# Patient Record
Sex: FEMALE | Race: WHITE | NOT HISPANIC OR LATINO | Employment: OTHER
[De-identification: names, ages, dates, MRNs, and addresses within clinical notes are randomized per-mention and may not be internally consistent; named-entity substitution may affect disease eponyms.]

---

## 2018-08-02 ENCOUNTER — HOSPITAL (OUTPATIENT)
Dept: OTHER | Age: 63
End: 2018-08-02
Attending: FAMILY MEDICINE

## 2018-10-15 ENCOUNTER — TELEPHONE (OUTPATIENT)
Dept: GASTROENTEROLOGY | Facility: CLINIC | Age: 63
End: 2018-10-15

## 2018-10-15 NOTE — TELEPHONE ENCOUNTER
NUHA form from Eugene Mclean was received and requesting a copy  of Colonoscopy Report. Colonoscopy Report and Photo's were faxed to [facility].   Fax# [phone]  Ph# [phone]

## 2018-11-27 PROBLEM — I48.0 PAROXYSMAL ATRIAL FIBRILLATION (HCC): Status: ACTIVE | Noted: 2018-11-27

## 2018-11-27 PROBLEM — I10 ESSENTIAL HYPERTENSION: Status: ACTIVE | Noted: 2018-11-27

## 2019-03-01 ENCOUNTER — PRIOR ORIGINAL RECORDS (OUTPATIENT)
Dept: OTHER | Age: 64
End: 2019-03-01

## 2019-03-07 VITALS
DIASTOLIC BLOOD PRESSURE: 76 MMHG | HEART RATE: 78 BPM | WEIGHT: 185 LBS | OXYGEN SATURATION: 98 % | HEIGHT: 66 IN | SYSTOLIC BLOOD PRESSURE: 118 MMHG | BODY MASS INDEX: 29.73 KG/M2

## 2019-03-18 RX ORDER — FLECAINIDE ACETATE 100 MG/1
TABLET ORAL
COMMUNITY
End: 2020-02-22 | Stop reason: SDUPTHER

## 2019-03-18 RX ORDER — ASPIRIN 325 MG
1 TABLET ORAL SEE ADMIN INSTRUCTIONS
COMMUNITY

## 2019-03-29 ENCOUNTER — APPOINTMENT (OUTPATIENT)
Dept: CARDIOLOGY | Age: 64
End: 2019-03-29
Attending: INTERNAL MEDICINE

## 2019-04-02 ENCOUNTER — TELEPHONE (OUTPATIENT)
Dept: CARDIOLOGY | Age: 64
End: 2019-04-02

## 2019-04-02 ENCOUNTER — ANCILLARY PROCEDURE (OUTPATIENT)
Dept: CARDIOLOGY | Age: 64
End: 2019-04-02
Attending: INTERNAL MEDICINE

## 2019-04-02 DIAGNOSIS — R94.39 ABNORMAL NUCLEAR STRESS TEST: Primary | ICD-10-CM

## 2019-04-02 DIAGNOSIS — I48.0 PAF (PAROXYSMAL ATRIAL FIBRILLATION) (CMD): ICD-10-CM

## 2019-04-02 DIAGNOSIS — I10 HYPERTENSION: ICD-10-CM

## 2019-04-02 LAB
HEART RATE RESERVE PREDICTED: 12.18 BPM
LV EF: 77 %
PEAK HR ACHIEVED: 137 BPM
RESTING HR ACHIEVED: 65 BPM
STRESS BASELINE BP: NORMAL MMHG
STRESS PERCENT HR: 88 %
STRESS POST ESTIMATED WORKLOAD: 8.2 METS
STRESS POST EXERCISE DUR MIN: 8 MIN
STRESS POST EXERCISE DUR SEC: 20 SEC
STRESS POST PEAK BP: NORMAL MMHG
STRESS TARGET HR: 156 BPM

## 2019-04-02 PROCEDURE — 78452 HT MUSCLE IMAGE SPECT MULT: CPT | Performed by: INTERNAL MEDICINE

## 2019-04-02 PROCEDURE — 93015 CV STRESS TEST SUPVJ I&R: CPT | Performed by: INTERNAL MEDICINE

## 2019-04-02 PROCEDURE — A9502 TC99M TETROFOSMIN: HCPCS | Performed by: INTERNAL MEDICINE

## 2019-04-02 ASSESSMENT — EXERCISE STRESS TEST
GRADE: 12
PEAK_RPP: 29318
PEAK_BP: 186/82
PEAK_RPP: 12284
PEAK_HR: 93
PEAK_BP: 158/78
PEAK_BP: 160/78
MPH: 1.7
PEAK_RPP: 10400
PEAK_BP: 178/76
STOPPAGE_REASON: GENERAL FATIGUE
PEAK_BP: 200/78
COMMENTS: INJECTED AT 7:00
PEAK_RPP: 14694
PEAK_HR: 83
STAGE_CATEGORIES: RESTING
PEAK_HR: 87
PEAK_RPP: 22878
PEAK_BP: 208/82
STAGE_CATEGORIES: RECOVERY 2
PEAK_HR: 137
PEAK_HR: 123
STAGE_CATEGORIES: RECOVERY 0
MPH: 2.5
STAGE_CATEGORIES: RECOVERY 3
STAGE_CATEGORIES: 3
PEAK_HR: 65
PEAK_RPP: 28496
GRADE: 14
STAGE_CATEGORIES: 1
PEAK_BP: 148/76
STAGE_CATEGORIES: RECOVERY 1
PEAK_HR: 137
PEAK_RPP: 19400
PEAK_RPP: 15486
STAGE_CATEGORIES: 2
PEAK_HR: 97
GRADE: 10
MPH: 3.2

## 2019-04-04 ENCOUNTER — TELEPHONE (OUTPATIENT)
Dept: CARDIOLOGY | Age: 64
End: 2019-04-04

## 2019-04-04 ENCOUNTER — ORDER TRANSCRIPTION (OUTPATIENT)
Dept: ADMINISTRATIVE | Facility: HOSPITAL | Age: 64
End: 2019-04-04

## 2019-04-04 DIAGNOSIS — R94.39 ABNORMAL CARDIOVASCULAR STRESS TEST: Primary | ICD-10-CM

## 2019-04-10 PROBLEM — I65.29 CAROTID ARTERY STENOSIS: Status: ACTIVE | Noted: 2019-04-10

## 2019-04-10 PROBLEM — G47.30 SLEEP APNEA: Status: ACTIVE | Noted: 2019-04-10

## 2019-04-10 PROBLEM — R01.1 MURMUR: Status: ACTIVE | Noted: 2019-04-10

## 2019-04-10 PROBLEM — I48.0 PAROXYSMAL ATRIAL FIBRILLATION (CMD): Status: ACTIVE | Noted: 2019-04-10

## 2019-04-10 PROBLEM — I10 HYPERTENSION: Status: ACTIVE | Noted: 2019-04-10

## 2019-04-10 PROBLEM — E03.9 HYPOTHYROIDISM: Status: ACTIVE | Noted: 2019-04-10

## 2019-04-10 PROBLEM — I47.10 SUPRAVENTRICULAR TACHYCARDIA: Status: ACTIVE | Noted: 2019-04-10

## 2019-04-12 ENCOUNTER — OFFICE VISIT (OUTPATIENT)
Dept: CARDIOLOGY | Age: 64
End: 2019-04-12

## 2019-04-12 VITALS
SYSTOLIC BLOOD PRESSURE: 130 MMHG | DIASTOLIC BLOOD PRESSURE: 70 MMHG | HEIGHT: 66 IN | OXYGEN SATURATION: 98 % | WEIGHT: 180 LBS | HEART RATE: 76 BPM | BODY MASS INDEX: 28.93 KG/M2

## 2019-04-12 DIAGNOSIS — Z79.899 ENCOUNTER FOR LONG-TERM (CURRENT) DRUG USE: ICD-10-CM

## 2019-04-12 DIAGNOSIS — I48.0 PAROXYSMAL ATRIAL FIBRILLATION (CMD): Primary | ICD-10-CM

## 2019-04-12 PROCEDURE — 3078F DIAST BP <80 MM HG: CPT | Performed by: INTERNAL MEDICINE

## 2019-04-12 PROCEDURE — 93000 ELECTROCARDIOGRAM COMPLETE: CPT | Performed by: INTERNAL MEDICINE

## 2019-04-12 PROCEDURE — 3075F SYST BP GE 130 - 139MM HG: CPT | Performed by: INTERNAL MEDICINE

## 2019-04-12 PROCEDURE — 99214 OFFICE O/P EST MOD 30 MIN: CPT | Performed by: INTERNAL MEDICINE

## 2019-04-12 RX ORDER — LOSARTAN POTASSIUM AND HYDROCHLOROTHIAZIDE 12.5; 5 MG/1; MG/1
1 TABLET ORAL DAILY
COMMUNITY
End: 2019-04-12 | Stop reason: CLARIF

## 2019-04-12 RX ORDER — METOPROLOL SUCCINATE 25 MG/1
25 TABLET, EXTENDED RELEASE ORAL DAILY
COMMUNITY
End: 2020-04-21 | Stop reason: SDUPTHER

## 2019-04-12 RX ORDER — AMLODIPINE BESYLATE 5 MG/1
5 TABLET ORAL DAILY
COMMUNITY
End: 2020-04-21 | Stop reason: SDUPTHER

## 2019-04-12 RX ORDER — MULTIVITAMIN,THER AND MINERALS
1 TABLET ORAL DAILY
COMMUNITY
End: 2019-10-15

## 2019-04-12 NOTE — IMAGING NOTE
Called pt for pre-CTA instructions.   Pt will be taking Metoprolol PO the evening prior and morning of exam.

## 2019-04-15 ENCOUNTER — HOSPITAL ENCOUNTER (OUTPATIENT)
Dept: CT IMAGING | Facility: HOSPITAL | Age: 64
Discharge: HOME | End: 2019-04-15
Attending: INTERNAL MEDICINE
Payer: COMMERCIAL

## 2019-04-15 VITALS
WEIGHT: 180 LBS | DIASTOLIC BLOOD PRESSURE: 61 MMHG | SYSTOLIC BLOOD PRESSURE: 116 MMHG | HEIGHT: 66 IN | HEART RATE: 56 BPM | BODY MASS INDEX: 28.93 KG/M2 | RESPIRATION RATE: 16 BRPM

## 2019-04-15 DIAGNOSIS — R94.39 ABNORMAL CARDIOVASCULAR STRESS TEST: ICD-10-CM

## 2019-04-15 PROCEDURE — 75574 CT ANGIO HRT W/3D IMAGE: CPT | Performed by: INTERNAL MEDICINE

## 2019-04-15 PROCEDURE — 82565 ASSAY OF CREATININE: CPT

## 2019-04-15 RX ORDER — METOPROLOL TARTRATE 5 MG/5ML
5 INJECTION INTRAVENOUS SEE ADMIN INSTRUCTIONS
Status: DISCONTINUED | OUTPATIENT
Start: 2019-04-15 | End: 2019-04-17

## 2019-04-15 RX ORDER — DILTIAZEM HYDROCHLORIDE 5 MG/ML
10 INJECTION INTRAVENOUS SEE ADMIN INSTRUCTIONS
Status: DISCONTINUED | OUTPATIENT
Start: 2019-04-15 | End: 2019-04-17

## 2019-04-15 RX ORDER — METOPROLOL TARTRATE 5 MG/5ML
INJECTION INTRAVENOUS
Status: COMPLETED
Start: 2019-04-15 | End: 2019-04-15

## 2019-04-15 RX ORDER — NITROGLYCERIN 0.4 MG/1
0.4 TABLET SUBLINGUAL ONCE
Status: COMPLETED | OUTPATIENT
Start: 2019-04-15 | End: 2019-04-15

## 2019-04-15 RX ADMIN — METOPROLOL TARTRATE 5 MG: 5 INJECTION INTRAVENOUS at 10:21:00

## 2019-04-15 RX ADMIN — METOPROLOL TARTRATE 5 MG: 5 INJECTION INTRAVENOUS at 10:16:00

## 2019-04-15 RX ADMIN — NITROGLYCERIN 0.4 MG: 0.4 TABLET SUBLINGUAL at 10:13:00

## 2019-04-15 NOTE — IMAGING NOTE
TO RAD HOLDING AT 0920  HX TAKEN PT CONSENTED AT 0926  VS HR 62  /73    18 GAUGE IV STARTED AT 0950 POC TESTING COMPLETED GFR = >60  CREATINE = 0.8    CTA ORDERED BY DR DE LA PAZ WAS PT GIVEN CTA  PREMEDS YES 25 MG ER PO METOPROLOL PM AND 50 MG ER PO MET

## 2019-04-16 ENCOUNTER — TELEPHONE (OUTPATIENT)
Dept: CARDIOLOGY | Age: 64
End: 2019-04-16

## 2019-04-16 DIAGNOSIS — I25.10 CORONARY ARTERY DISEASE INVOLVING NATIVE CORONARY ARTERY OF NATIVE HEART WITHOUT ANGINA PECTORIS: Primary | ICD-10-CM

## 2019-06-07 LAB
ABSOLUTE IMMATURE GRANULOCYTES (OFFPRE24): NORMAL
ALBUMIN SERPL-MCNC: 3.8 G/DL
ALBUMIN/GLOB SERPL: NORMAL {RATIO}
ALP SERPL-CCNC: 89 U/L
ALT SERPL-CCNC: 25 U/L
ANION GAP SERPL CALC-SCNC: NORMAL MMOL/L
AST SERPL-CCNC: 16 U/L
BASO+EOS+MONOS # BLD: NORMAL 10*3/UL
BASO+EOS+MONOS NFR BLD: NORMAL %
BASOPHILS # BLD: NORMAL 10*3/UL
BASOPHILS NFR BLD: NORMAL %
BILIRUB SERPL-MCNC: 0.72 MG/DL
BUN SERPL-MCNC: NORMAL MG/DL
BUN/CREAT SERPL: NORMAL
CALCIUM SERPL-MCNC: 10.3 MG/DL
CHLORIDE SERPL-SCNC: 110 MMOL/L
CHOLEST SERPL-MCNC: 174 MG/DL
CHOLEST/HDLC SERPL: NORMAL {RATIO}
CO2 SERPL-SCNC: 26.7 MMOL/L
CREAT SERPL-MCNC: 0.86 MG/DL
DIFFERENTIAL METHOD BLD: NORMAL
EOSINOPHIL # BLD: NORMAL 10*3/UL
EOSINOPHIL NFR BLD: NORMAL %
ERYTHROCYTE [DISTWIDTH] IN BLOOD: NORMAL %
GLOBULIN SER-MCNC: NORMAL G/DL
GLUCOSE SERPL-MCNC: 94 MG/DL
HCT VFR BLD CALC: 43.8 %
HDLC SERPL-MCNC: 66 MG/DL
HGB BLD-MCNC: 14 G/DL
IMMATURE GRANULOCYTES (OFFPRE25): NORMAL
LDLC SERPL CALC-MCNC: 81 MG/DL
LENGTH OF FAST TIME PATIENT: YES H
LENGTH OF FAST TIME PATIENT: YES H
LYMPHOCYTES # BLD: NORMAL 10*3/UL
LYMPHOCYTES NFR BLD: NORMAL %
MCH RBC QN AUTO: NORMAL PG
MCHC RBC AUTO-ENTMCNC: NORMAL G/DL
MCV RBC AUTO: NORMAL FL
MONOCYTES # BLD: NORMAL 10*3/UL
MONOCYTES NFR BLD: NORMAL %
MPV (OFFPRE2): NORMAL
NEUTROPHILS # BLD: NORMAL 10*3/UL
NEUTROPHILS NFR BLD: NORMAL %
NONHDLC SERPL-MCNC: NORMAL MG/DL
NRBC BLD MANUAL-RTO: NORMAL %
PLAT MORPH BLD: NORMAL
PLATELET # BLD: NORMAL 10*3/UL
POTASSIUM SERPL-SCNC: 4.3 MMOL/L
PROT SERPL-MCNC: 7.2 G/DL
RBC # BLD: 4.73 10*6/UL
RBC MORPH BLD: NORMAL
SODIUM SERPL-SCNC: 146 MMOL/L
TRIGL SERPL-MCNC: 136 MG/DL
VLDLC SERPL CALC-MCNC: NORMAL MG/DL
WBC # BLD: 6.66 10*3/UL
WBC MORPH BLD: NORMAL

## 2019-10-14 RX ORDER — LOSARTAN POTASSIUM 50 MG/1
1 TABLET ORAL EVERY 24 HOURS
COMMUNITY
End: 2019-10-15

## 2019-10-14 RX ORDER — DICLOFENAC SODIUM 75 MG/1
75 TABLET, DELAYED RELEASE ORAL 2 TIMES DAILY
COMMUNITY
Start: 2019-06-13 | End: 2019-10-15

## 2019-10-14 RX ORDER — LEVOTHYROXINE SODIUM 0.07 MG/1
1 TABLET ORAL DAILY
COMMUNITY
Start: 2019-08-13 | End: 2020-04-21 | Stop reason: SDUPTHER

## 2019-10-14 RX ORDER — LOSARTAN POTASSIUM AND HYDROCHLOROTHIAZIDE 12.5; 5 MG/1; MG/1
1 TABLET ORAL EVERY 24 HOURS
COMMUNITY
End: 2019-10-15

## 2019-10-14 RX ORDER — MULTIVITAMIN
1 TABLET ORAL
COMMUNITY

## 2019-10-15 ENCOUNTER — OFFICE VISIT (OUTPATIENT)
Dept: CARDIOLOGY | Age: 64
End: 2019-10-15

## 2019-10-15 VITALS
SYSTOLIC BLOOD PRESSURE: 130 MMHG | OXYGEN SATURATION: 97 % | DIASTOLIC BLOOD PRESSURE: 70 MMHG | BODY MASS INDEX: 29.73 KG/M2 | HEIGHT: 66 IN | HEART RATE: 82 BPM | WEIGHT: 185 LBS

## 2019-10-15 DIAGNOSIS — I48.0 PAROXYSMAL ATRIAL FIBRILLATION (CMD): Primary | ICD-10-CM

## 2019-10-15 DIAGNOSIS — Z79.899 ENCOUNTER FOR LONG-TERM (CURRENT) DRUG USE: ICD-10-CM

## 2019-10-15 DIAGNOSIS — I47.10 SUPRAVENTRICULAR TACHYCARDIA: ICD-10-CM

## 2019-10-15 DIAGNOSIS — I10 ESSENTIAL HYPERTENSION: ICD-10-CM

## 2019-10-15 PROCEDURE — 99214 OFFICE O/P EST MOD 30 MIN: CPT | Performed by: INTERNAL MEDICINE

## 2019-10-15 PROCEDURE — 93000 ELECTROCARDIOGRAM COMPLETE: CPT | Performed by: INTERNAL MEDICINE

## 2019-10-15 ASSESSMENT — PATIENT HEALTH QUESTIONNAIRE - PHQ9
SUM OF ALL RESPONSES TO PHQ9 QUESTIONS 1 AND 2: 0
1. LITTLE INTEREST OR PLEASURE IN DOING THINGS: NOT AT ALL
SUM OF ALL RESPONSES TO PHQ9 QUESTIONS 1 AND 2: 0
2. FEELING DOWN, DEPRESSED OR HOPELESS: NOT AT ALL

## 2020-01-27 LAB
ABSOLUTE IMMATURE GRANULOCYTES (OFFPRE24): NORMAL
ALBUMIN SERPL-MCNC: 4.2 G/DL
ALBUMIN/GLOB SERPL: NORMAL {RATIO}
ALP SERPL-CCNC: 90 U/L
ALT SERPL-CCNC: 46 U/L
ANION GAP SERPL CALC-SCNC: NORMAL MMOL/L
AST SERPL-CCNC: 22 U/L
BASO+EOS+MONOS # BLD: NORMAL 10*3/UL
BASO+EOS+MONOS NFR BLD: NORMAL %
BASOPHILS # BLD: NORMAL 10*3/UL
BASOPHILS NFR BLD: NORMAL %
BILIRUB SERPL-MCNC: 0.72 MG/DL
BUN SERPL-MCNC: 20 MG/DL
BUN/CREAT SERPL: NORMAL
CALCIUM SERPL-MCNC: 9.9 MG/DL
CHLORIDE SERPL-SCNC: 103 MMOL/L
CHOLEST SERPL-MCNC: 172 MG/DL
CHOLEST/HDLC SERPL: NORMAL {RATIO}
CO2 SERPL-SCNC: NORMAL MMOL/L
CREAT SERPL-MCNC: 0.82 MG/DL
DIFFERENTIAL METHOD BLD: NORMAL
EOSINOPHIL # BLD: NORMAL 10*3/UL
EOSINOPHIL NFR BLD: NORMAL %
ERYTHROCYTE [DISTWIDTH] IN BLOOD: NORMAL %
GLOBULIN SER-MCNC: NORMAL G/DL
GLUCOSE SERPL-MCNC: 89 MG/DL
HCT VFR BLD CALC: 43.1 %
HDLC SERPL-MCNC: 65 MG/DL
HGB BLD-MCNC: 14.5 G/DL
IMMATURE GRANULOCYTES (OFFPRE25): NORMAL
LDLC SERPL CALC-MCNC: 79 MG/DL
LENGTH OF FAST TIME PATIENT: NORMAL H
LENGTH OF FAST TIME PATIENT: NORMAL H
LYMPHOCYTES # BLD: NORMAL 10*3/UL
LYMPHOCYTES NFR BLD: NORMAL %
MCH RBC QN AUTO: 30.7 PG
MCHC RBC AUTO-ENTMCNC: 33.6 G/DL
MCV RBC AUTO: 91.3 FL
MONOCYTES # BLD: NORMAL 10*3/UL
MONOCYTES NFR BLD: NORMAL %
MPV (OFFPRE2): NORMAL
NEUTROPHILS # BLD: NORMAL 10*3/UL
NEUTROPHILS NFR BLD: NORMAL %
NONHDLC SERPL-MCNC: NORMAL MG/DL
NRBC BLD MANUAL-RTO: NORMAL %
PLAT MORPH BLD: NORMAL
PLATELET # BLD: 302 10*3/UL
POTASSIUM SERPL-SCNC: 4.3 MMOL/L
PROT SERPL-MCNC: 7.4 G/DL
RBC # BLD: 4.72 10*6/UL
RBC MORPH BLD: NORMAL
SODIUM SERPL-SCNC: 140 MMOL/L
TRIGL SERPL-MCNC: 142 MG/DL
VLDLC SERPL CALC-MCNC: NORMAL MG/DL
WBC # BLD: 7.57 10*3/UL
WBC MORPH BLD: NORMAL

## 2020-02-24 RX ORDER — FLECAINIDE ACETATE 100 MG/1
TABLET ORAL
Qty: 180 TABLET | Refills: 0 | Status: SHIPPED | OUTPATIENT
Start: 2020-02-24 | End: 2020-04-21 | Stop reason: SDUPTHER

## 2020-04-21 ENCOUNTER — OFFICE VISIT (OUTPATIENT)
Dept: CARDIOLOGY | Age: 65
End: 2020-04-21

## 2020-04-21 VITALS — WEIGHT: 185 LBS | BODY MASS INDEX: 29.73 KG/M2 | HEART RATE: 65 BPM | HEIGHT: 66 IN

## 2020-04-21 DIAGNOSIS — I48.0 PAROXYSMAL ATRIAL FIBRILLATION (CMD): ICD-10-CM

## 2020-04-21 DIAGNOSIS — I10 ESSENTIAL HYPERTENSION: Primary | ICD-10-CM

## 2020-04-21 DIAGNOSIS — Z79.899 ENCOUNTER FOR LONG-TERM (CURRENT) DRUG USE: ICD-10-CM

## 2020-04-21 PROCEDURE — 99214 OFFICE O/P EST MOD 30 MIN: CPT | Performed by: INTERNAL MEDICINE

## 2020-04-21 RX ORDER — LEVOTHYROXINE SODIUM 0.07 MG/1
75 TABLET ORAL DAILY
Qty: 90 TABLET | Refills: 3 | Status: SHIPPED | OUTPATIENT
Start: 2020-04-21

## 2020-04-21 RX ORDER — AMLODIPINE BESYLATE 5 MG/1
5 TABLET ORAL DAILY
Qty: 90 TABLET | Refills: 3 | Status: SHIPPED | OUTPATIENT
Start: 2020-04-21 | End: 2020-10-16 | Stop reason: SDUPTHER

## 2020-04-21 RX ORDER — METOPROLOL SUCCINATE 25 MG/1
25 TABLET, EXTENDED RELEASE ORAL DAILY
Qty: 90 TABLET | Refills: 3 | Status: SHIPPED | OUTPATIENT
Start: 2020-04-21 | End: 2020-10-16 | Stop reason: SDUPTHER

## 2020-04-21 RX ORDER — FLECAINIDE ACETATE 100 MG/1
100 TABLET ORAL 2 TIMES DAILY
Qty: 180 TABLET | Refills: 3 | Status: SHIPPED | OUTPATIENT
Start: 2020-04-21 | End: 2020-10-16 | Stop reason: SDUPTHER

## 2020-04-21 ASSESSMENT — PATIENT HEALTH QUESTIONNAIRE - PHQ9
SUM OF ALL RESPONSES TO PHQ9 QUESTIONS 1 AND 2: 0
1. LITTLE INTEREST OR PLEASURE IN DOING THINGS: NOT AT ALL
2. FEELING DOWN, DEPRESSED OR HOPELESS: NOT AT ALL
SUM OF ALL RESPONSES TO PHQ9 QUESTIONS 1 AND 2: 0

## 2020-08-28 PROBLEM — C43.59 MALIGNANT MELANOMA OF TORSO EXCLUDING BREAST (HCC): Status: ACTIVE | Noted: 2020-08-28

## 2020-08-28 LAB
ALBUMIN SERPL-MCNC: 4.7 G/DL
ALP SERPL-CCNC: 79 U/L
ALT SERPL-CCNC: 17 UNITS/L
AST SERPL-CCNC: 19 UNITS/L
BILIRUB SERPL-MCNC: 0.61 MG/DL
CALCIUM SERPL-MCNC: 9.9 MG/DL
CHLORIDE SERPL-SCNC: 104 MMOL/L
CHOLEST SERPL-MCNC: 162 MG/DL
CHOLEST/HDLC SERPL: 2 {RATIO}
CO2 SERPL-SCNC: 21.2 MMOL/L
GLUCOSE SERPL-MCNC: 92 MG/DL
HCT VFR BLD CALC: 42.8 %
HDLC SERPL-MCNC: 67 MG/DL
HGB BLD-MCNC: 14.3 G/DL
LDLC SERPL CALC-MCNC: 75 MG/DL
POTASSIUM SERPL-SCNC: 4.28 MMOL/L
PROT SERPL-MCNC: 7 G/DL
RBC # BLD: 4.66 10*6/UL
SODIUM SERPL-SCNC: 140 MMOL/L
TRIGL SERPL-MCNC: 99 MG/DL
TSH SERPL-ACNC: 1.64 MCUNITS/ML
VLDLC SERPL CALC-MCNC: 20 MG/DL
WBC # BLD: 7.73 K/MCL

## 2020-10-02 ENCOUNTER — APPOINTMENT (OUTPATIENT)
Dept: CARDIOLOGY | Age: 65
End: 2020-10-02

## 2020-10-16 ENCOUNTER — OFFICE VISIT (OUTPATIENT)
Dept: CARDIOLOGY | Age: 65
End: 2020-10-16

## 2020-10-16 VITALS
HEART RATE: 63 BPM | WEIGHT: 190 LBS | BODY MASS INDEX: 30.53 KG/M2 | SYSTOLIC BLOOD PRESSURE: 130 MMHG | DIASTOLIC BLOOD PRESSURE: 74 MMHG | HEIGHT: 66 IN | OXYGEN SATURATION: 97 %

## 2020-10-16 DIAGNOSIS — I10 ESSENTIAL HYPERTENSION: ICD-10-CM

## 2020-10-16 DIAGNOSIS — Z79.899 ENCOUNTER FOR LONG-TERM (CURRENT) DRUG USE: ICD-10-CM

## 2020-10-16 DIAGNOSIS — I48.0 PAROXYSMAL ATRIAL FIBRILLATION (CMD): Primary | ICD-10-CM

## 2020-10-16 PROCEDURE — 93000 ELECTROCARDIOGRAM COMPLETE: CPT | Performed by: INTERNAL MEDICINE

## 2020-10-16 PROCEDURE — 99214 OFFICE O/P EST MOD 30 MIN: CPT | Performed by: INTERNAL MEDICINE

## 2020-10-16 PROCEDURE — X1094 ELECTROCARDIOGRAM 12-LEAD: HCPCS | Performed by: INTERNAL MEDICINE

## 2020-10-16 RX ORDER — FLECAINIDE ACETATE 100 MG/1
100 TABLET ORAL 2 TIMES DAILY
Qty: 180 TABLET | Refills: 3 | Status: SHIPPED | OUTPATIENT
Start: 2020-10-16

## 2020-10-16 RX ORDER — AMLODIPINE BESYLATE 5 MG/1
5 TABLET ORAL DAILY
Qty: 90 TABLET | Refills: 3 | Status: SHIPPED | OUTPATIENT
Start: 2020-10-16

## 2020-10-16 RX ORDER — METOPROLOL SUCCINATE 25 MG/1
25 TABLET, EXTENDED RELEASE ORAL DAILY
Qty: 90 TABLET | Refills: 3 | Status: SHIPPED | OUTPATIENT
Start: 2020-10-16

## 2020-10-16 ASSESSMENT — PATIENT HEALTH QUESTIONNAIRE - PHQ9
1. LITTLE INTEREST OR PLEASURE IN DOING THINGS: NOT AT ALL
SUM OF ALL RESPONSES TO PHQ9 QUESTIONS 1 AND 2: 0
CLINICAL INTERPRETATION OF PHQ2 SCORE: NO FURTHER SCREENING NEEDED
SUM OF ALL RESPONSES TO PHQ9 QUESTIONS 1 AND 2: 0
2. FEELING DOWN, DEPRESSED OR HOPELESS: NOT AT ALL
CLINICAL INTERPRETATION OF PHQ9 SCORE: NO FURTHER SCREENING NEEDED

## 2020-10-19 DIAGNOSIS — I48.0 PAROXYSMAL ATRIAL FIBRILLATION (CMD): ICD-10-CM

## 2020-10-23 ENCOUNTER — APPOINTMENT (OUTPATIENT)
Dept: CARDIOLOGY | Age: 65
End: 2020-10-23

## 2021-02-06 DIAGNOSIS — Z23 NEED FOR VACCINATION: ICD-10-CM

## 2021-03-13 ENCOUNTER — IMMUNIZATION (OUTPATIENT)
Dept: LAB | Age: 66
End: 2021-03-13

## 2021-03-13 DIAGNOSIS — Z23 NEED FOR VACCINATION: Primary | ICD-10-CM

## 2021-03-13 PROCEDURE — 0001A COVID 19 PFIZER-BIONTECH: CPT

## 2021-03-13 PROCEDURE — 91300 COVID 19 PFIZER-BIONTECH: CPT

## 2021-04-05 ENCOUNTER — IMMUNIZATION (OUTPATIENT)
Dept: LAB | Age: 66
End: 2021-04-05
Attending: HOSPITALIST

## 2021-04-05 DIAGNOSIS — Z23 NEED FOR VACCINATION: Primary | ICD-10-CM

## 2021-04-05 PROCEDURE — 91300 COVID 19 PFIZER-BIONTECH: CPT

## 2021-04-05 PROCEDURE — 0002A COVID 19 PFIZER-BIONTECH: CPT

## 2021-05-07 ENCOUNTER — OFFICE VISIT (OUTPATIENT)
Dept: CARDIOLOGY | Age: 66
End: 2021-05-07

## 2021-05-07 VITALS
HEIGHT: 66 IN | WEIGHT: 186 LBS | SYSTOLIC BLOOD PRESSURE: 124 MMHG | BODY MASS INDEX: 29.89 KG/M2 | HEART RATE: 63 BPM | DIASTOLIC BLOOD PRESSURE: 82 MMHG | OXYGEN SATURATION: 96 %

## 2021-05-07 DIAGNOSIS — Z79.899 ENCOUNTER FOR LONG-TERM (CURRENT) DRUG USE: ICD-10-CM

## 2021-05-07 DIAGNOSIS — I48.0 PAROXYSMAL ATRIAL FIBRILLATION (CMD): ICD-10-CM

## 2021-05-07 DIAGNOSIS — I10 ESSENTIAL HYPERTENSION: Primary | ICD-10-CM

## 2021-05-07 PROCEDURE — 93000 ELECTROCARDIOGRAM COMPLETE: CPT | Performed by: INTERNAL MEDICINE

## 2021-05-07 PROCEDURE — 99214 OFFICE O/P EST MOD 30 MIN: CPT | Performed by: INTERNAL MEDICINE

## 2021-05-07 ASSESSMENT — PATIENT HEALTH QUESTIONNAIRE - PHQ9
CLINICAL INTERPRETATION OF PHQ2 SCORE: NO FURTHER SCREENING NEEDED
CLINICAL INTERPRETATION OF PHQ9 SCORE: NO FURTHER SCREENING NEEDED
SUM OF ALL RESPONSES TO PHQ9 QUESTIONS 1 AND 2: 0
2. FEELING DOWN, DEPRESSED OR HOPELESS: NOT AT ALL
1. LITTLE INTEREST OR PLEASURE IN DOING THINGS: NOT AT ALL
SUM OF ALL RESPONSES TO PHQ9 QUESTIONS 1 AND 2: 0

## 2021-08-02 ENCOUNTER — HOSPITAL ENCOUNTER (OUTPATIENT)
Dept: GENERAL RADIOLOGY | Age: 66
Discharge: HOME | End: 2021-08-02

## 2021-08-02 DIAGNOSIS — M85.80 OSTEOPENIA: ICD-10-CM

## 2021-08-02 DIAGNOSIS — E28.39 ESTROGEN DEFICIENCY: ICD-10-CM

## 2021-08-02 PROCEDURE — 77080 DXA BONE DENSITY AXIAL: CPT

## 2021-09-13 PROBLEM — N18.5 CHRONIC RENAL DISEASE, STAGE V (HCC): Status: ACTIVE | Noted: 2021-09-13

## (undated) NOTE — LETTER
[address] [facility]    Consent for Coronary CT Angiography    Your doctor has recommended that you, Dolores Clintекатеринаstephany have a Coronary CT Angiography procedure.  Coronary CT Angiography is a diagnostic procedure using computed tomog patient when taking medication. Allergic reactions to medication can range from very minor to very serious leading to a life threatening situation or even death. Please be sure to communicate any allergy you may have to your caregiver immediately.     The i